# Patient Record
Sex: MALE | Race: WHITE
[De-identification: names, ages, dates, MRNs, and addresses within clinical notes are randomized per-mention and may not be internally consistent; named-entity substitution may affect disease eponyms.]

---

## 2019-07-28 ENCOUNTER — HOSPITAL ENCOUNTER (EMERGENCY)
Dept: HOSPITAL 53 - M ED | Age: 39
Discharge: HOME | End: 2019-07-28
Payer: COMMERCIAL

## 2019-07-28 VITALS — WEIGHT: 289.6 LBS | BODY MASS INDEX: 37.17 KG/M2 | HEIGHT: 74 IN

## 2019-07-28 VITALS — SYSTOLIC BLOOD PRESSURE: 152 MMHG | DIASTOLIC BLOOD PRESSURE: 90 MMHG

## 2019-07-28 DIAGNOSIS — R07.89: ICD-10-CM

## 2019-07-28 DIAGNOSIS — M54.12: Primary | ICD-10-CM

## 2019-07-28 DIAGNOSIS — E66.9: ICD-10-CM

## 2019-07-28 DIAGNOSIS — Z88.6: ICD-10-CM

## 2019-07-28 DIAGNOSIS — J30.2: ICD-10-CM

## 2019-07-28 DIAGNOSIS — Z79.82: ICD-10-CM

## 2019-07-28 DIAGNOSIS — Z82.49: ICD-10-CM

## 2019-07-28 LAB
ALBUMIN SERPL BCG-MCNC: 3.9 GM/DL (ref 3.2–5.2)
ALT SERPL W P-5'-P-CCNC: 88 U/L (ref 12–78)
BASOPHILS # BLD AUTO: 0.1 10^3/UL (ref 0–0.2)
BASOPHILS NFR BLD AUTO: 0.8 % (ref 0–1)
BILIRUB CONJ SERPL-MCNC: < 0.1 MG/DL (ref 0–0.2)
BILIRUB SERPL-MCNC: 0.5 MG/DL (ref 0.2–1)
BUN SERPL-MCNC: 18 MG/DL (ref 7–18)
CALCIUM SERPL-MCNC: 8.5 MG/DL (ref 8.5–10.1)
CHLORIDE SERPL-SCNC: 113 MEQ/L (ref 98–107)
CK MB CFR.DF SERPL CALC: 0.51
CK MB CFR.DF SERPL CALC: 0.57
CK MB SERPL-MCNC: 1 NG/ML (ref ?–3.6)
CK MB SERPL-MCNC: < 1 NG/ML (ref ?–3.6)
CK SERPL-CCNC: 174 U/L (ref 39–308)
CK SERPL-CCNC: 197 U/L (ref 39–308)
CO2 SERPL-SCNC: 23 MEQ/L (ref 21–32)
CREAT SERPL-MCNC: 0.95 MG/DL (ref 0.7–1.3)
EOSINOPHIL # BLD AUTO: 0.1 10^3/UL (ref 0–0.5)
EOSINOPHIL NFR BLD AUTO: 1.9 % (ref 0–3)
GFR SERPL CREATININE-BSD FRML MDRD: > 60 ML/MIN/{1.73_M2} (ref 60–?)
GLUCOSE SERPL-MCNC: 95 MG/DL (ref 70–100)
HCT VFR BLD AUTO: 44.5 % (ref 42–52)
HGB BLD-MCNC: 15.6 G/DL (ref 13.5–17.5)
LIPASE SERPL-CCNC: 120 U/L (ref 73–393)
LYMPHOCYTES # BLD AUTO: 1.6 10^3/UL (ref 1.5–4.5)
LYMPHOCYTES NFR BLD AUTO: 24.2 % (ref 24–44)
MCH RBC QN AUTO: 30.2 PG (ref 27–33)
MCHC RBC AUTO-ENTMCNC: 35.1 G/DL (ref 32–36.5)
MCV RBC AUTO: 86.1 FL (ref 80–96)
MONOCYTES # BLD AUTO: 0.5 10^3/UL (ref 0–0.8)
MONOCYTES NFR BLD AUTO: 7.5 % (ref 0–5)
NEUTROPHILS # BLD AUTO: 4.2 10^3/UL (ref 1.8–7.7)
NEUTROPHILS NFR BLD AUTO: 65 % (ref 36–66)
PLATELET # BLD AUTO: 268 10^3/UL (ref 150–450)
POTASSIUM SERPL-SCNC: 4.2 MEQ/L (ref 3.5–5.1)
PROT SERPL-MCNC: 7.1 GM/DL (ref 6.4–8.2)
RBC # BLD AUTO: 5.17 10^6/UL (ref 4.3–6.1)
SODIUM SERPL-SCNC: 142 MEQ/L (ref 136–145)
TROPONIN I SERPL-MCNC: < 0.02 NG/ML (ref ?–0.1)
TROPONIN I SERPL-MCNC: < 0.02 NG/ML (ref ?–0.1)
WBC # BLD AUTO: 6.4 10^3/UL (ref 4–10)

## 2019-07-28 NOTE — ECGEPIP
[facility] - ED

                                       

                                       Test Date:    2019

Pat Name:     JAIRON DEL RIO           Department:   

Patient ID:   [id number]                 Room:         -

Gender:       Male                     Technician:   

:          1980               Requested By: Seymour ZHAO

Order Number: [id number]     Reading MD:   Frida De Los Santos

                                 Measurements

Intervals                              Axis          

Rate:         86                       P:            11

ND:           179                      QRS:          -13

QRSD:         120                      T:            17

QT:           359                                    

QTc:          431                                    

                           Interpretive Statements

SINUS RHYTHM WITH MARKED SINUS ARRHYTHMIA

INFERIOR MYOCARDIAL INFARCTION, PROBABLY OLD WITH POSTERIOR EXTENSION

NO PRIOR

Electronically Signed on 2019 21:26:39 EDT by Frida De Los Santos

## 2019-07-28 NOTE — ECGEPIP
[facility] - ED

                                       

                                       Test Date:    2019

Pat Name:     JAIRON DEL RIO           Department:   

Patient ID:   [id number]                 Room:         -

Gender:       Male                     Technician:   

:          1980               Requested By: Seymour ZHAO

Order Number: [id number]     Reading MD:   Frida De Los Santos

                                 Measurements

Intervals                              Axis          

Rate:         75                       P:            8

VT:           179                      QRS:          -12

QRSD:         116                      T:            15

QT:           383                                    

QTc:          429                                    

                           Interpretive Statements

SINUS RHYTHM

INFERIOR MYOCARDIAL INFARCTION, PROBABLY OLD WITH POSTERIOR EXTENSION

DECREASE RATE 10:21

Electronically Signed on 2019 21:30:29 EDT by Frida De Los Santos

## 2019-07-28 NOTE — REPVR
EXAM: 

CT Cervical Spine Without Contrast 



EXAM DATE/TIME: 

7/28/2019 2:07 PM 



CLINICAL HISTORY: 

39 years old, male; Numbness; Additional info: Left c5 radiculopathy 



TECHNIQUE: 

Imaging protocol: Computed tomography images of the cervical spine without 

contrast. Coronal and sagittal reformatted images were created and reviewed. 

Radiation optimization: All CT scans at this facility use at least one of these 

dose optimization techniques: automated exposure control; mA and/or kV 

adjustment per patient size (includes targeted exams where dose is matched to 

clinical indication); or iterative reconstruction. 



COMPARISON: 

No relevant prior studies available. 



FINDINGS: 

 Vertebral body heights are intact. 

 Straightening of the cervical lordotic curve could be secondary to muscle 

spasm or positioning. Alignment is otherwise maintained. 

 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 

 No acute fracture is identified. 

 The prevertebral soft tissues are not significantly swollen. 

 The visualized lung apices are essentially clear, and no apical pneumothorax 

is identified. 

 There is mild multilevel spondylosis with fairly mild appearing osteophytic 

encroachment of several neural foramina. CT is not optimal for the evaluation 

of the discs, neural foramina or spinal canal or cord. No significant spinal 

stenosis is evident. 



IMPRESSION: 

Mild degenerative disk disease which could be better evaluated by means of MRI 

as clinically indicated.



Electronically signed by: Sergio Alaniz On 07/28/2019  15:28:27 PM

## 2019-07-30 NOTE — REP
Portable chest, 10:56 a.m., single AP view with the patient upright:

There are no comparisons.

The lung fields are clear.

The cardiac size is normal.

The clifford, mediastinum, and skeletal structures are unremarkable.

Impression:

Negative portable chest.

 

 

Electronically Signed by

Robin Beavers MD 07/28/2019 11:04 A